# Patient Record
Sex: FEMALE | Race: WHITE | Employment: FULL TIME | URBAN - NONMETROPOLITAN AREA
[De-identification: names, ages, dates, MRNs, and addresses within clinical notes are randomized per-mention and may not be internally consistent; named-entity substitution may affect disease eponyms.]

---

## 2018-02-10 ENCOUNTER — HOSPITAL ENCOUNTER (EMERGENCY)
Age: 38
Discharge: HOME | End: 2018-02-11
Payer: MEDICARE

## 2018-02-10 DIAGNOSIS — R10.9 FLANK PAIN: ICD-10-CM

## 2018-02-10 DIAGNOSIS — F41.1 ANXIETY STATE: Primary | ICD-10-CM

## 2018-02-10 LAB
ANION GAP SERPL CALCULATED.3IONS-SCNC: 13 MEQ/L (ref 8–16)
BACTERIA: ABNORMAL /HPF
BASOPHILS # BLD: 0.2 %
BASOPHILS ABSOLUTE: 0 THOU/MM3 (ref 0–0.1)
BILIRUBIN URINE: NEGATIVE
BLOOD, URINE: ABNORMAL
BUN BLDV-MCNC: 6 MG/DL (ref 7–22)
CALCIUM SERPL-MCNC: 9.1 MG/DL (ref 8.5–10.5)
CASTS 2: ABNORMAL /LPF
CASTS UA: ABNORMAL /LPF
CHARACTER, URINE: CLEAR
CHLORIDE BLD-SCNC: 100 MEQ/L (ref 98–111)
CO2: 26 MEQ/L (ref 23–33)
COLOR: YELLOW
CREAT SERPL-MCNC: 0.6 MG/DL (ref 0.4–1.2)
CRYSTALS, UA: ABNORMAL
EOSINOPHIL # BLD: 0.2 %
EOSINOPHILS ABSOLUTE: 0 THOU/MM3 (ref 0–0.4)
EPITHELIAL CELLS, UA: ABNORMAL /HPF
FLU A ANTIGEN: NEGATIVE
FLU B ANTIGEN: NEGATIVE
GFR SERPL CREATININE-BSD FRML MDRD: > 90 ML/MIN/1.73M2
GLUCOSE BLD-MCNC: 101 MG/DL (ref 70–108)
GLUCOSE URINE: NEGATIVE MG/DL
HCT VFR BLD CALC: 47.3 % (ref 37–47)
HEMOGLOBIN: 15.9 GM/DL (ref 12–16)
KETONES, URINE: NEGATIVE
LEUKOCYTE ESTERASE, URINE: NEGATIVE
LYMPHOCYTES # BLD: 21.8 %
LYMPHOCYTES ABSOLUTE: 1.4 THOU/MM3 (ref 1–4.8)
MCH RBC QN AUTO: 32 PG (ref 27–31)
MCHC RBC AUTO-ENTMCNC: 33.6 GM/DL (ref 33–37)
MCV RBC AUTO: 95.5 FL (ref 81–99)
MISCELLANEOUS 2: ABNORMAL
MONOCYTES # BLD: 7.1 %
MONOCYTES ABSOLUTE: 0.5 THOU/MM3 (ref 0.4–1.3)
NITRITE, URINE: NEGATIVE
NUCLEATED RED BLOOD CELLS: 0 /100 WBC
OSMOLALITY CALCULATION: 275.3 MOSMOL/KG (ref 275–300)
PDW BLD-RTO: 13.9 % (ref 11.5–14.5)
PH UA: 7
PLATELET # BLD: 211 THOU/MM3 (ref 130–400)
PMV BLD AUTO: 7.9 FL (ref 7.4–10.4)
POTASSIUM SERPL-SCNC: 4 MEQ/L (ref 3.5–5.2)
PROTEIN UA: NEGATIVE
RBC # BLD: 4.95 MILL/MM3 (ref 4.2–5.4)
RBC URINE: ABNORMAL /HPF
RENAL EPITHELIAL, UA: ABNORMAL
SEG NEUTROPHILS: 70.7 %
SEGMENTED NEUTROPHILS ABSOLUTE COUNT: 4.6 THOU/MM3 (ref 1.8–7.7)
SODIUM BLD-SCNC: 139 MEQ/L (ref 135–145)
SPECIFIC GRAVITY, URINE: < 1.005 (ref 1–1.03)
UROBILINOGEN, URINE: 0.2 EU/DL
WBC # BLD: 6.5 THOU/MM3 (ref 4.8–10.8)
WBC UA: ABNORMAL /HPF
YEAST: ABNORMAL

## 2018-02-10 PROCEDURE — 80048 BASIC METABOLIC PNL TOTAL CA: CPT

## 2018-02-10 PROCEDURE — 36415 COLL VENOUS BLD VENIPUNCTURE: CPT

## 2018-02-10 PROCEDURE — 87804 INFLUENZA ASSAY W/OPTIC: CPT

## 2018-02-10 PROCEDURE — 81001 URINALYSIS AUTO W/SCOPE: CPT

## 2018-02-10 PROCEDURE — 85025 COMPLETE CBC W/AUTO DIFF WBC: CPT

## 2018-02-10 PROCEDURE — 99284 EMERGENCY DEPT VISIT MOD MDM: CPT

## 2018-02-10 PROCEDURE — 6370000000 HC RX 637 (ALT 250 FOR IP): Performed by: NURSE PRACTITIONER

## 2018-02-10 RX ORDER — ALPRAZOLAM 0.5 MG/1
0.5 TABLET ORAL ONCE
Status: COMPLETED | OUTPATIENT
Start: 2018-02-10 | End: 2018-02-10

## 2018-02-10 RX ADMIN — ALPRAZOLAM 0.5 MG: 0.5 TABLET ORAL at 21:53

## 2018-02-10 ASSESSMENT — ENCOUNTER SYMPTOMS
DIARRHEA: 0
NAUSEA: 0
BACK PAIN: 1
SINUS PAIN: 1
SHORTNESS OF BREATH: 0
VOMITING: 0
ABDOMINAL PAIN: 0
CHEST TIGHTNESS: 0
CONSTIPATION: 0
RHINORRHEA: 1
SINUS PRESSURE: 1

## 2018-02-10 ASSESSMENT — PAIN DESCRIPTION - ONSET: ONSET: ON-GOING

## 2018-02-10 ASSESSMENT — PAIN DESCRIPTION - FREQUENCY: FREQUENCY: INTERMITTENT

## 2018-02-10 ASSESSMENT — PAIN DESCRIPTION - LOCATION: LOCATION: BACK

## 2018-02-10 ASSESSMENT — PAIN DESCRIPTION - PAIN TYPE: TYPE: ACUTE PAIN

## 2018-02-10 ASSESSMENT — PAIN DESCRIPTION - PROGRESSION: CLINICAL_PROGRESSION: GRADUALLY WORSENING

## 2018-02-10 ASSESSMENT — PAIN DESCRIPTION - ORIENTATION: ORIENTATION: MID

## 2018-02-10 ASSESSMENT — PAIN SCALES - GENERAL: PAINLEVEL_OUTOF10: 6

## 2018-02-11 ENCOUNTER — APPOINTMENT (OUTPATIENT)
Dept: CT IMAGING | Age: 38
End: 2018-02-11
Payer: MEDICARE

## 2018-02-11 VITALS
HEART RATE: 88 BPM | DIASTOLIC BLOOD PRESSURE: 78 MMHG | BODY MASS INDEX: 44.22 KG/M2 | SYSTOLIC BLOOD PRESSURE: 116 MMHG | TEMPERATURE: 98.4 F | OXYGEN SATURATION: 98 % | WEIGHT: 259 LBS | RESPIRATION RATE: 18 BRPM | HEIGHT: 64 IN

## 2018-02-11 LAB — PREGNANCY, SERUM: NEGATIVE

## 2018-02-11 PROCEDURE — 84703 CHORIONIC GONADOTROPIN ASSAY: CPT

## 2018-02-11 PROCEDURE — 74176 CT ABD & PELVIS W/O CONTRAST: CPT

## 2018-02-11 PROCEDURE — 36415 COLL VENOUS BLD VENIPUNCTURE: CPT

## 2018-02-11 RX ORDER — VENLAFAXINE HYDROCHLORIDE 150 MG/1
150 CAPSULE, EXTENDED RELEASE ORAL DAILY
Qty: 6 CAPSULE | Refills: 0 | Status: SHIPPED | OUTPATIENT
Start: 2018-02-11 | End: 2019-04-29

## 2018-02-11 NOTE — ED NOTES
Patient resting on cot, respirations are easy and unlabored. Updated on POC. Patient denies any needs or concerns at this time. Patient's family is at bedside.       Barron Knott RN  02/10/18 7811

## 2018-02-11 NOTE — ED PROVIDER NOTES
[address]       Chief Complaint   Patient presents with    Other     out of medications, can't get control of emotions        Nurses Notes reviewed and I agree except as noted in the HPI. HISTORY OF PRESENT ILLNESS    Hussein Holcomb is a 40 y.o. female who presents For anxiety. Patient states that she has been out of her Effexor for 3 days and now feels like everything is hittting her at once. Patient states that she has been unable to get to the PCP to refill the prescription for Effexor. Today the patient is also complaining of left flank pain. He states that she has been having the left flank pain and painful urination since Friday. Denies any blood in the urine denies vaginal bleeding or vaginal discharge. REVIEW OF SYSTEMS     Review of Systems   Constitutional: Negative. HENT: Positive for congestion, rhinorrhea, sinus pain and sinus pressure. Respiratory: Negative for chest tightness and shortness of breath. Cardiovascular: Negative for chest pain and palpitations. Gastrointestinal: Negative for abdominal pain, constipation, diarrhea, nausea and vomiting. Genitourinary: Positive for difficulty urinating and flank pain. Negative for hematuria, vaginal bleeding, vaginal discharge and vaginal pain. Musculoskeletal: Positive for arthralgias and back pain. Skin: Negative. Neurological: Negative for dizziness, light-headedness and numbness. Psychiatric/Behavioral: The patient is nervous/anxious. PAST MEDICAL HISTORY    has a past medical history of Anxiety; Bipolar disorder ([address] and Depression. SURGICAL HISTORY      has no past surgical history on file. CURRENT MEDICATIONS       Previous Medications    No medications on file       ALLERGIES     has No Known Allergies. FAMILY HISTORY     has no family status information on file. family history is not on file.     SOCIAL HISTORY      reports that she has been smoking bowel obstruction. No urinary tract calculi. No hydroureteronephrosis. 5.5 x 5.8 x 7.4 cm right adnexal cystic lesion. Recommend pelvic ultrasound correlation. IUD in satisfactory position in the uterus. **This report has been created using voice recognition software. It may contain minor errors which are inherent in voice recognition technology. **      Final report electronically signed by Dr. Gilma Kemp on 2/11/2018 1:52 AM        .    Patient was informed of CT results. Patient declined an ultrasound which was offered to further evaluate the right adnexal cystic lesion as she is having no right sided abdominal pain. Laboratory work was reassuring. I observed the patient's condition to remain stable during the duration of the stay and I explained my proposed course of treatment to the patient, who was amenable to my decision. They were discharged home in stable condition with a prescription for 6 days worth of effexor after the patient presented the Rx bottle which was filled on 1-8-18 and 30 tablets were dispenced, and the patient will return to the ED if the symptoms become more severe in nature or otherwise change    I have given the patient strict written and verbal instructions about care at home, follow-up, and signs and symptoms of worsening of condition and they did verbalize understanding. Medications   ALPRAZolam Radonna Solo) tablet 0.5 mg (0.5 mg Oral Given 2/10/18 2943)         Patient was seen independently by myself. The Patient's final impression and disposition and plan was determined by myself. CRITICAL CARE:   None    CONSULTS:  None    PROCEDURES:  None    FINAL IMPRESSION      1. Anxiety state    2. Flank pain          DISPOSITION/PLAN   Patient was discharged in stable condition. Will return if symptoms change or worsen, or for any sign or symptom deemed emergent by the patient or family members.  Follow up as an outpatient, sooner if symptoms warrant.     PATIENT REFERRED TO:  DO Kel Nickerson  [address]    In 3 days      We [facility] Mat  As discussed with           DISCHARGE MEDICATIONS:  New Prescriptions    VENLAFAXINE (EFFEXOR XR) 150 MG EXTENDED RELEASE CAPSULE    Take 1 capsule by mouth daily       (Please note that portions of this note were completed with a voice recognition program.  Efforts were made to edit the dictations but occasionally words are mis-transcribed.)    Tisha De Anda, [address] [facility], [address] [facility]  02/11/18 8073

## 2018-03-18 ENCOUNTER — APPOINTMENT (OUTPATIENT)
Dept: ULTRASOUND IMAGING | Age: 38
End: 2018-03-18
Payer: MEDICARE

## 2018-03-18 ENCOUNTER — HOSPITAL ENCOUNTER (EMERGENCY)
Age: 38
Discharge: HOME | End: 2018-03-18
Payer: MEDICARE

## 2018-03-18 VITALS
SYSTOLIC BLOOD PRESSURE: 123 MMHG | OXYGEN SATURATION: 97 % | BODY MASS INDEX: 41.88 KG/M2 | WEIGHT: 245.3 LBS | RESPIRATION RATE: 18 BRPM | DIASTOLIC BLOOD PRESSURE: 56 MMHG | TEMPERATURE: 98.2 F | HEART RATE: 97 BPM | HEIGHT: 64 IN

## 2018-03-18 DIAGNOSIS — B37.89 CANDIDA RASH OF GROIN: ICD-10-CM

## 2018-03-18 DIAGNOSIS — N30.01 ACUTE CYSTITIS WITH HEMATURIA: Primary | ICD-10-CM

## 2018-03-18 LAB
BACTERIA: ABNORMAL /HPF
BILIRUBIN URINE: NEGATIVE
BLOOD, URINE: ABNORMAL
CASTS 2: ABNORMAL /LPF
CASTS UA: ABNORMAL /LPF
CHARACTER, URINE: ABNORMAL
CHLAMYDIA TRACHOMATIS BY RT-PCR: NOT DETECTED
COLOR: YELLOW
CRYSTALS, UA: ABNORMAL
CT/NG SOURCE: NORMAL
EPITHELIAL CELLS, UA: ABNORMAL /HPF
GLUCOSE URINE: NEGATIVE MG/DL
KETONES, URINE: NEGATIVE
KOH PREP: NORMAL
LEUKOCYTE ESTERASE, URINE: ABNORMAL
MISCELLANEOUS 2: ABNORMAL
NEISSERIA GONORRHOEAE BY RT-PCR: NOT DETECTED
NITRITE, URINE: NEGATIVE
PH UA: 6
PREGNANCY, URINE: NEGATIVE
PROTEIN UA: NEGATIVE
RBC URINE: ABNORMAL /HPF
RENAL EPITHELIAL, UA: ABNORMAL
SPECIFIC GRAVITY, URINE: < 1.005 (ref 1–1.03)
UROBILINOGEN, URINE: 1 EU/DL
WBC UA: ABNORMAL /HPF
YEAST: ABNORMAL

## 2018-03-18 PROCEDURE — 87220 TISSUE EXAM FOR FUNGI: CPT

## 2018-03-18 PROCEDURE — 81025 URINE PREGNANCY TEST: CPT

## 2018-03-18 PROCEDURE — 81001 URINALYSIS AUTO W/SCOPE: CPT

## 2018-03-18 PROCEDURE — 87205 SMEAR GRAM STAIN: CPT

## 2018-03-18 PROCEDURE — 87491 CHLMYD TRACH DNA AMP PROBE: CPT

## 2018-03-18 PROCEDURE — 99284 EMERGENCY DEPT VISIT MOD MDM: CPT

## 2018-03-18 PROCEDURE — 87591 N.GONORRHOEAE DNA AMP PROB: CPT

## 2018-03-18 PROCEDURE — 76830 TRANSVAGINAL US NON-OB: CPT

## 2018-03-18 PROCEDURE — 87086 URINE CULTURE/COLONY COUNT: CPT

## 2018-03-18 PROCEDURE — 87070 CULTURE OTHR SPECIMN AEROBIC: CPT

## 2018-03-18 RX ORDER — ONDANSETRON 4 MG/1
4 TABLET, FILM COATED ORAL EVERY 8 HOURS PRN
COMMUNITY
End: 2019-04-29

## 2018-03-18 RX ORDER — AMOXICILLIN AND CLAVULANATE POTASSIUM 250; 125 MG/1; MG/1
1 TABLET, FILM COATED ORAL 2 TIMES DAILY
COMMUNITY
Start: 2018-03-10 | End: 2019-04-29

## 2018-03-18 RX ORDER — NITROFURANTOIN 25; 75 MG/1; MG/1
100 CAPSULE ORAL 2 TIMES DAILY
Qty: 14 CAPSULE | Refills: 0 | Status: SHIPPED | OUTPATIENT
Start: 2018-03-18 | End: 2018-03-25

## 2018-03-18 RX ORDER — NYSTATIN 100000 [USP'U]/G
POWDER TOPICAL
Qty: 15 G | Refills: 0 | Status: SHIPPED | OUTPATIENT
Start: 2018-03-18 | End: 2019-04-29

## 2018-03-18 RX ORDER — OXCARBAZEPINE 300 MG/1
900 TABLET, FILM COATED ORAL DAILY
COMMUNITY
End: 2019-04-29

## 2018-03-18 ASSESSMENT — PAIN DESCRIPTION - PAIN TYPE: TYPE: ACUTE PAIN

## 2018-03-18 ASSESSMENT — PAIN DESCRIPTION - DESCRIPTORS: DESCRIPTORS: BURNING;CONSTANT

## 2018-03-18 ASSESSMENT — PAIN DESCRIPTION - FREQUENCY: FREQUENCY: CONTINUOUS

## 2018-03-18 ASSESSMENT — ENCOUNTER SYMPTOMS
CHEST TIGHTNESS: 0
SHORTNESS OF BREATH: 0
RHINORRHEA: 0
NAUSEA: 0
ABDOMINAL PAIN: 1
TROUBLE SWALLOWING: 0
VOMITING: 0
SORE THROAT: 0
WHEEZING: 0
COUGH: 0

## 2018-03-18 ASSESSMENT — PAIN DESCRIPTION - LOCATION: LOCATION: VAGINA

## 2018-03-18 ASSESSMENT — PAIN DESCRIPTION - ONSET: ONSET: SUDDEN

## 2018-03-18 ASSESSMENT — PAIN SCALES - GENERAL: PAINLEVEL_OUTOF10: 6

## 2018-03-18 ASSESSMENT — PAIN DESCRIPTION - PROGRESSION: CLINICAL_PROGRESSION: NOT CHANGED

## 2018-03-18 NOTE — ED NOTES
Patient lying on cart with eyes closed and resp easy and nonlabored. Daughter at bedside and states that \"mom is tired, had a long day'.       Manjinder Aguilar RN  03/18/18 7157

## 2018-03-18 NOTE — ED PROVIDER NOTES
[address]       Chief Complaint   Patient presents with    Dysuria    Vaginal Pain       Nurses Notes reviewed and I agree except as noted in the HPI. HISTORY OF PRESENT ILLNESS    Arjun Vang is a 40 y.o. female who presents For vaginal pain, itching, redness. Patient states this is been ongoing for several days. Patient states that she is taking antibiotics for an ear infection and believes that that is related to her symptoms. Patient is denying any new sexual partners however the patient told nursing staff that she currently has 2 boyfriend and she was treated for Trichomonas one month ago. REVIEW OF SYSTEMS     Review of Systems   Constitutional: Negative for fever. HENT: Negative for congestion, rhinorrhea, sore throat and trouble swallowing. Respiratory: Negative for cough, chest tightness, shortness of breath and wheezing. Cardiovascular: Negative for chest pain and palpitations. Gastrointestinal: Positive for abdominal pain. Negative for nausea and vomiting. Genitourinary: Positive for dysuria, vaginal discharge and vaginal pain. Negative for vaginal bleeding. Musculoskeletal: Negative for arthralgias and myalgias. Neurological: Negative for headaches. Hematological: Does not bruise/bleed easily. PAST MEDICAL HISTORY    has a past medical history of Anxiety; Bipolar disorder ([address] Depression; and Sexually transmitted infection. SURGICAL HISTORY      has no past surgical history on file.     CURRENT MEDICATIONS       Discharge Medication List as of 3/18/2018  5:02 AM      CONTINUE these medications which have NOT CHANGED    Details   OXcarbazepine (TRILEPTAL) 300 MG tablet Take 900 mg by mouth dailyHistorical Med      ondansetron (ZOFRAN) 4 MG tablet Take 4 mg by mouth every 8 hours as needed for Nausea or VomitingHistorical Med      amoxicillin-clavulanate (AUGMENTIN) 250-125 MG per tablet Take 1 tablet by mouth 2 Including but not limited to yeast infection, UTI, STD    DIAGNOSTIC RESULTS     EKG: All EKG's are interpreted by the Emergency Department Physician who either signs or Co-signs this chart in the absence of a cardiologist.  None    RADIOLOGY: non-plain film images(s) such as CT, Ultrasound and MRI are read by the radiologist.  Plain radiographic images are visualized and preliminarily interpreted by the emergency physician unless otherwise stated below. US NON OB TRANSVAGINAL   Final Result   1. Normal pelvic ultrasound. 2.  The IUD is present within the fundus of the uterus. **This report has been created using voice recognition software. It may contain minor errors which are inherent in voice recognition technology. **      Final report electronically signed by Dr. Lalit Brizuela on 3/18/2018 3:50 AM            LABS:   Labs Reviewed   URINE WITH REFLEXED MICRO - Abnormal; Notable for the following:        Result Value    Blood, Urine TRACE (*)     Leukocyte Esterase, Urine LARGE (*)     Character, Urine CLOUDY (*)     All other components within normal limits   KOH (SKIN,HAIR,NAILS)    Narrative:     Source: vaginal non-OB patient       Site:           Current Antibiotics: none   C. TRACHOMATIS / N. GONORRHOEAE, DNA   URINE CULTURE, REFLEXED   GENITAL CULTURE   PREGNANCY, URINE         EMERGENCY DEPARTMENT COURSE AND MEDICAL DECISION MAKING:   Vitals:    Vitals:    03/18/18 0153 03/18/18 0430   BP: 105/74 (!) 123/56   Pulse: 84 97   Resp: 18 18   Temp: 98.2 °F (36.8 °C)    TempSrc: Oral    SpO2: 98% 97%   Weight: 245 lb 4.8 oz (111.3 kg)    Height: 5' 4\" (1.626 m)          Pertinent Labs & Imaging studies reviewed. (See chart for details)    The patient was seen and evaluated within the ED today with Dysuria, vaginal pain, burning. Within the department, I observed the patient's vital signs to be within acceptable range.   On exam, I appreciated findings as documented in the physical exam.

## 2018-03-18 NOTE — ED NOTES
Patient presents to the ED with complaints of dysuria and vaginal pain. Patient states pain started on Thursday 03/08/2018 when she was diagnosed with a bladder infection. Patient states she is currently on day 7 of 10 of Augmentin. Patient states she has tried multiple OTC products to ease the pain and excoriation without relief. Patient states she currently has two sexual partners and was diagnosed with trichomoniasis approximately two months ago.       Jack Israel RN  03/18/18 4656

## 2018-03-19 LAB
ORGANISM: ABNORMAL
URINE CULTURE REFLEX: ABNORMAL

## 2018-03-21 LAB
GENITAL CULTURE, ROUTINE: ABNORMAL
GENITAL CULTURE, ROUTINE: ABNORMAL
GRAM STAIN RESULT: ABNORMAL
ORGANISM: ABNORMAL

## 2018-03-22 ENCOUNTER — TELEPHONE (OUTPATIENT)
Dept: PHARMACY | Age: 38
End: 2018-03-22

## 2018-03-23 NOTE — TELEPHONE ENCOUNTER
Call attempt #2, did not reach patient. Left message for patient to call back at [phone].     Armando Nassar, PharmD 3:14 PM 3/23/2018

## 2018-03-24 RX ORDER — FLUCONAZOLE 150 MG/1
150 TABLET ORAL ONCE
Qty: 1 TABLET | Refills: 0
Start: 2018-03-24 | End: 2018-03-24

## 2018-03-24 NOTE — TELEPHONE ENCOUNTER
Telephoned rx for Fluconazole 150 mg into [facility] in [place], [place] per patient request. Patient confirmed understanding to continue using nystatin powder as directed.  Grayson Hauser, PharmD 3/24/2018  3:14 PM

## 2019-04-29 ENCOUNTER — HOSPITAL ENCOUNTER (EMERGENCY)
Age: 39
Discharge: HOME | End: 2019-04-29
Attending: EMERGENCY MEDICINE
Payer: MEDICARE

## 2019-04-29 VITALS
HEART RATE: 78 BPM | SYSTOLIC BLOOD PRESSURE: 115 MMHG | DIASTOLIC BLOOD PRESSURE: 81 MMHG | RESPIRATION RATE: 17 BRPM | WEIGHT: 245 LBS | BODY MASS INDEX: 42.05 KG/M2 | TEMPERATURE: 98 F | OXYGEN SATURATION: 98 %

## 2019-04-29 DIAGNOSIS — F32.1 CURRENT MODERATE EPISODE OF MAJOR DEPRESSIVE DISORDER WITHOUT PRIOR EPISODE (HCC): Primary | ICD-10-CM

## 2019-04-29 LAB
ACETAMINOPHEN LEVEL: < 5 UG/ML (ref 0–20)
ALBUMIN SERPL-MCNC: 4.4 G/DL (ref 3.5–5.1)
ALP BLD-CCNC: 74 U/L (ref 38–126)
ALT SERPL-CCNC: 15 U/L (ref 11–66)
AMPHETAMINE+METHAMPHETAMINE URINE SCREEN: NEGATIVE
ANION GAP SERPL CALCULATED.3IONS-SCNC: 13 MEQ/L (ref 8–16)
AST SERPL-CCNC: 15 U/L (ref 5–40)
BACTERIA: NORMAL
BARBITURATE QUANTITATIVE URINE: NEGATIVE
BASOPHILS # BLD: 0.3 %
BASOPHILS ABSOLUTE: 0 THOU/MM3 (ref 0–0.1)
BENZODIAZEPINE QUANTITATIVE URINE: NEGATIVE
BILIRUB SERPL-MCNC: 0.2 MG/DL (ref 0.3–1.2)
BILIRUBIN DIRECT: < 0.2 MG/DL (ref 0–0.3)
BILIRUBIN URINE: NEGATIVE
BLOOD, URINE: NEGATIVE
BUN BLDV-MCNC: 6 MG/DL (ref 7–22)
CALCIUM SERPL-MCNC: 9.4 MG/DL (ref 8.5–10.5)
CANNABINOID QUANTITATIVE URINE: NEGATIVE
CASTS: NORMAL /LPF
CASTS: NORMAL /LPF
CHARACTER, URINE: CLEAR
CHLORIDE BLD-SCNC: 103 MEQ/L (ref 98–111)
CO2: 25 MEQ/L (ref 23–33)
COCAINE METABOLITE QUANTITATIVE URINE: NEGATIVE
COLOR: YELLOW
CREAT SERPL-MCNC: 0.7 MG/DL (ref 0.4–1.2)
CRYSTALS: NORMAL
EOSINOPHIL # BLD: 0.8 %
EOSINOPHILS ABSOLUTE: 0.1 THOU/MM3 (ref 0–0.4)
EPITHELIAL CELLS, UA: NORMAL /HPF
ERYTHROCYTE [DISTWIDTH] IN BLOOD BY AUTOMATED COUNT: 12.4 % (ref 11.5–14.5)
ERYTHROCYTE [DISTWIDTH] IN BLOOD BY AUTOMATED COUNT: 45.1 FL (ref 35–45)
ETHYL ALCOHOL, SERUM: < 0.01 %
GFR SERPL CREATININE-BSD FRML MDRD: > 90 ML/MIN/1.73M2
GLUCOSE BLD-MCNC: 94 MG/DL (ref 70–108)
GLUCOSE, URINE: NEGATIVE MG/DL
HCT VFR BLD CALC: 46.7 % (ref 37–47)
HEMOGLOBIN: 15.8 GM/DL (ref 12–16)
IMMATURE GRANS (ABS): 0.04 THOU/MM3 (ref 0–0.07)
IMMATURE GRANULOCYTES: 0.4 %
KETONES, URINE: NEGATIVE
LEUKOCYTE ESTERASE, URINE: NEGATIVE
LIPASE: 22.7 U/L (ref 5.6–51.3)
LYMPHOCYTES # BLD: 30.9 %
LYMPHOCYTES ABSOLUTE: 3.2 THOU/MM3 (ref 1–4.8)
MAGNESIUM: 2.2 MG/DL (ref 1.6–2.4)
MCH RBC QN AUTO: 33.3 PG (ref 26–33)
MCHC RBC AUTO-ENTMCNC: 33.8 GM/DL (ref 32.2–35.5)
MCV RBC AUTO: 98.3 FL (ref 81–99)
MISCELLANEOUS LAB TEST RESULT: NORMAL
MONOCYTES # BLD: 4.9 %
MONOCYTES ABSOLUTE: 0.5 THOU/MM3 (ref 0.4–1.3)
NITRITE, URINE: NEGATIVE
NUCLEATED RED BLOOD CELLS: 0 /100 WBC
OPIATES, URINE: NEGATIVE
OSMOLALITY CALCULATION: 278.6 MOSMOL/KG (ref 275–300)
OXYCODONE: NEGATIVE
PH UA: 7.5 (ref 5–9)
PHENCYCLIDINE QUANTITATIVE URINE: NEGATIVE
PLATELET # BLD: 351 THOU/MM3 (ref 130–400)
PMV BLD AUTO: 9 FL (ref 9.4–12.4)
POTASSIUM SERPL-SCNC: 4.5 MEQ/L (ref 3.5–5.2)
PREGNANCY, SERUM: NEGATIVE
PROTEIN UA: NEGATIVE MG/DL
RBC # BLD: 4.75 MILL/MM3 (ref 4.2–5.4)
RBC URINE: NORMAL /HPF
RENAL EPITHELIAL, UA: NORMAL
SALICYLATE, SERUM: < 0.3 MG/DL (ref 2–10)
SEG NEUTROPHILS: 62.7 %
SEGMENTED NEUTROPHILS ABSOLUTE COUNT: 6.6 THOU/MM3 (ref 1.8–7.7)
SODIUM BLD-SCNC: 141 MEQ/L (ref 135–145)
SPECIFIC GRAVITY UA: < 1.005 (ref 1–1.03)
TOTAL PROTEIN: 7.4 G/DL (ref 6.1–8)
TSH SERPL DL<=0.05 MIU/L-ACNC: 1.29 UIU/ML (ref 0.4–4.2)
UROBILINOGEN, URINE: 0.2 EU/DL (ref 0–1)
WBC # BLD: 10.5 THOU/MM3 (ref 4.8–10.8)
WBC UA: NORMAL /HPF
YEAST: NORMAL

## 2019-04-29 PROCEDURE — 80053 COMPREHEN METABOLIC PANEL: CPT

## 2019-04-29 PROCEDURE — 81001 URINALYSIS AUTO W/SCOPE: CPT

## 2019-04-29 PROCEDURE — 99285 EMERGENCY DEPT VISIT HI MDM: CPT

## 2019-04-29 PROCEDURE — 85025 COMPLETE CBC W/AUTO DIFF WBC: CPT

## 2019-04-29 PROCEDURE — 84443 ASSAY THYROID STIM HORMONE: CPT

## 2019-04-29 PROCEDURE — 36415 COLL VENOUS BLD VENIPUNCTURE: CPT

## 2019-04-29 PROCEDURE — 80307 DRUG TEST PRSMV CHEM ANLYZR: CPT

## 2019-04-29 PROCEDURE — 84703 CHORIONIC GONADOTROPIN ASSAY: CPT

## 2019-04-29 PROCEDURE — G0480 DRUG TEST DEF 1-7 CLASSES: HCPCS

## 2019-04-29 PROCEDURE — 82248 BILIRUBIN DIRECT: CPT

## 2019-04-29 PROCEDURE — 83690 ASSAY OF LIPASE: CPT

## 2019-04-29 PROCEDURE — 83735 ASSAY OF MAGNESIUM: CPT

## 2019-04-29 ASSESSMENT — ENCOUNTER SYMPTOMS
EYE ITCHING: 0
VOICE CHANGE: 0
RHINORRHEA: 0
BLOOD IN STOOL: 0
DIARRHEA: 0
PHOTOPHOBIA: 0
ABDOMINAL PAIN: 0
SORE THROAT: 0
CHOKING: 0
WHEEZING: 0
EYE REDNESS: 0
CONSTIPATION: 0
NAUSEA: 0
BACK PAIN: 0
EYE DISCHARGE: 0
VOMITING: 0
ABDOMINAL DISTENTION: 0
TROUBLE SWALLOWING: 0
COUGH: 0
CHEST TIGHTNESS: 0
SHORTNESS OF BREATH: 0
EYE PAIN: 0
SINUS PRESSURE: 0

## 2019-04-29 ASSESSMENT — SLEEP AND FATIGUE QUESTIONNAIRES
DIFFICULTY FALLING ASLEEP: YES
DIFFICULTY STAYING ASLEEP: YES
RESTFUL SLEEP: NO
DO YOU HAVE DIFFICULTY SLEEPING: YES
AVERAGE NUMBER OF SLEEP HOURS: 3
DO YOU USE A SLEEP AID: NO
DIFFICULTY ARISING: NO
SLEEP PATTERN: DIFFICULTY FALLING ASLEEP;EARLY AWAKENING;RESTLESSNESS

## 2019-04-29 ASSESSMENT — PATIENT HEALTH QUESTIONNAIRE - PHQ9: SUM OF ALL RESPONSES TO PHQ QUESTIONS 1-9: 18

## 2019-04-29 ASSESSMENT — LIFESTYLE VARIABLES: HISTORY_ALCOHOL_USE: NO

## 2019-04-30 NOTE — ED NOTES
Patient placed in safe room that is ligature resistant with continuous monitoring in place. Provider notified, requested an assessment by behavioral health . Patient belongings secured in a locked lockers outside of the room. Explained suicide prevention precautions to the patient including constant observer.       Sol Guardado RN  04/29/19 5791

## 2019-04-30 NOTE — ED TRIAGE NOTES
Pt arrives to the ED with a chief complaint of being suicidal and anxiety. Pt reports she has history of bi-polar disease and was at her family doctor today and was having thoughts of not wanting live anymore. Pt denies any plan on killing herself, states she just doesn't want to live anymore. Pt is moved to 25, pt is changed into scrubs. Provider to see patient.

## 2019-04-30 NOTE — ED PROVIDER NOTES
[facility]  eMERGENCY dEPARTMENT eNCOUnter          CHIEF COMPLAINT       Chief Complaint   Patient presents with    Suicidal    Anxiety       Nurses Notes reviewed and I agreeexcept as noted in the HPI. HISTORY OF PRESENT ILLNESS    Rubio Finney is a 41 y.o. female who presents to the Emergency Department for the evaluation of suicidal ideation and anxiety. The patient has a history of bipolar disorder. The patient reports that she has been off her medications for the past year. Over the course of the past 4-5 months her symptoms have been worsening. Earlier today the patient states she had a \"breakdown\" in the parking lot of her work. The patient states that she attempted to get in with her PCP to be placed back on her medications. She was evaluated by her PCP and mentioned that she was experiencing suicidal ideation and was instructed to come to the ED for emergent intervention. The patient states that she has no plan or intent to commit suicide. The patient states that she would not commit suicide because she has several children who need her. The patient reports having several unexplained emotional moments without known exacerbating stressors, since she was 16years old. The patient denies any physical complaints, or any other signs or symptoms at this time. The patient denies any alcohol consumption, or illicit drug use. The HPI was provided by the patient. REVIEW OF SYSTEMS      Review of Systems   Constitutional: Negative for activity change, appetite change, diaphoresis, fatigue and unexpected weight change. HENT: Negative for congestion, ear discharge, ear pain, hearing loss, rhinorrhea, sinus pressure, sore throat, trouble swallowing and voice change. Eyes: Negative for photophobia, pain, discharge, redness and itching. Respiratory: Negative for cough, choking, chest tightness, shortness of breath and wheezing.     Cardiovascular: Negative for chest pain, palpitations and leg swelling. Gastrointestinal: Negative for abdominal distention, abdominal pain, blood in stool, constipation, diarrhea, nausea and vomiting. Endocrine: Negative for polydipsia, polyphagia and polyuria. Genitourinary: Negative for decreased urine volume, difficulty urinating, dysuria, enuresis, frequency, hematuria and urgency. Musculoskeletal: Negative for arthralgias, back pain, gait problem, myalgias, neck pain and neck stiffness. Skin: Negative for pallor and rash. Allergic/Immunologic: Negative for immunocompromised state. Neurological: Negative for dizziness, tremors, seizures, syncope, facial asymmetry, weakness, light-headedness, numbness and headaches. Hematological: Negative for adenopathy. Does not bruise/bleed easily. Psychiatric/Behavioral: Positive for suicidal ideas. Negative for agitation and hallucinations. The patient is nervous/anxious. PAST MEDICAL HISTORY    has a past medical history of Anxiety, Bipolar disorder ([facility] [address] Depression, and Sexually transmitted infection. SURGICAL HISTORY      has no past surgical history on file. CURRENT MEDICATIONS       Discharge Medication List as of 4/29/2019 10:55 PM          ALLERGIES     has No Known Allergies. FAMILY HISTORY     has no family status information on file. family history is not on file. SOCIAL HISTORY    reports that she has been smoking cigarettes. She has been smoking about 2.00 packs per day. She has never used smokeless tobacco. She reports that she does not drink alcohol or use drugs. PHYSICAL EXAM     INITIAL VITALS:  weight is 245 lb (111.1 kg). Her oral temperature is 98 °F (36.7 °C). Her blood pressure is 115/81 and her pulse is 78. Her respiration is 17 and oxygen saturation is 98%. Physical Exam   Constitutional: She is oriented to person, place, and time. She appears well-developed and well-nourished. No distress. HENT:   Head: Normocephalic and atraumatic. Right Ear: External ear normal.   Left Ear: External ear normal.   Nose: Nose normal.   Mouth/Throat: Oropharynx is clear and moist. No oropharyngeal exudate. Eyes: Pupils are equal, round, and reactive to light. Conjunctivae and EOM are normal. Right eye exhibits no discharge. Left eye exhibits no discharge. No scleral icterus. Neck: Normal range of motion. Neck supple. No JVD present. No tracheal deviation present. Cardiovascular: Normal rate, regular rhythm, normal heart sounds and intact distal pulses. Exam reveals no gallop and no friction rub. No murmur heard. Pulmonary/Chest: Effort normal and breath sounds normal. She has no wheezes. She has no rales. Abdominal: Soft. Bowel sounds are normal. She exhibits no mass. There is no tenderness. There is no rebound and no guarding. Musculoskeletal: Normal range of motion. She exhibits no edema or tenderness. Lymphadenopathy:     She has no cervical adenopathy. Neurological: She is alert and oriented to person, place, and time. She has normal reflexes. She displays normal reflexes. No cranial nerve deficit. She exhibits normal muscle tone. Coordination normal.   Skin: Skin is warm and dry. No rash noted. She is not diaphoretic. Psychiatric: She has a normal mood and affect. Her behavior is normal. Judgment normal. She expresses no suicidal ideation. She expresses no suicidal plans. Nursing note and vitals reviewed.         DIFFERENTIAL DIAGNOSIS:   Differential diagnoses were discussed extensively with the patient and family including but no limited to suicidal ideation, substance induced mood disorder, major depressive disorder, bipolar disorder      DIAGNOSTIC RESULTS     EKG: All EKG's are interpreted by the Emergency Department Physician who either signs or Co-signs this chart in the absence of a cardiologist.  EKG interpreted by Devonte Campos DO:    None    RADIOLOGY: non-plain film images(s) such as CT, Ultrasound and MRI are read by the radiologist.    No orders to display       LABS:   Labs Reviewed   CBC WITH AUTO DIFFERENTIAL - Abnormal; Notable for the following components:       Result Value    MCH 33.3 (*)     RDW-SD 45.1 (*)     MPV 9.0 (*)     All other components within normal limits   BASIC METABOLIC PANEL - Abnormal; Notable for the following components:    BUN 6 (*)     All other components within normal limits   HEPATIC FUNCTION PANEL - Abnormal; Notable for the following components: Total Bilirubin 0.2 (*)     All other components within normal limits   SALICYLATE LEVEL - Abnormal; Notable for the following components:    Salicylate, Serum < 0.3 (*)     All other components within normal limits   LIPASE   MAGNESIUM   HCG, SERUM, QUALITATIVE   TSH WITHOUT REFLEX   ACETAMINOPHEN LEVEL   ETHANOL   URINE DRUG SCREEN   URINALYSIS WITH MICROSCOPIC   ANION GAP   GLOMERULAR FILTRATION RATE, ESTIMATED   OSMOLALITY       EMERGENCY DEPARTMENT COURSE:   Vitals:    Vitals:    04/29/19 2102   BP: 115/81   Pulse: 78   Resp: 17   Temp: 98 °F (36.7 °C)   TempSrc: Oral   SpO2: 98%   Weight: 245 lb (111.1 kg)     9:33 PM: The patient was seen andevaluated. Appropriate labs were ordered and reviewed. The patient was seen and evaluated in a timely manner for suicidal ideation, and an unexplained emotional event. Her vital signs were stable. During the physical exam I noted a benign exam. I ordered appropriate labs. Imaging results were reviewed and discussed with the patient. I discussed the case with Pallavi Alexander of the [facility] team who agreed to evaluate the patient in the ED. She evaluated the patient in the ED, after the patient was medically cleared, and discussed the case with Dr. Jessica Mccord, the on call psychiatrist, who recommended outpatient follow up with [facility]. The patient responded well to treatment, and I noted her condition to remain. I agreed that the patient could be discharged home with instructions.  I explained my proposed course of discharge to the patient, and she verbalized understanding and agreement with my proposed plan. All her questions were addressed at bedside. The patient will return to the emergency department for any new or worsening symptoms. Patient has what appears to be mild depressive disorder. Patient is instructed to follow-up with [facility] either here or in [place] to establish with her psychiatrist and restart her medications as previously prescribed. Patient is instructed to take all and any other medications as currently prescribed. Patient is instructed to follow-up with her primary care physician and to do so within the next 1-2 days. Patient is instructed return to the emergency room immediately for any new or worsening complaints. CRITICALCARE:   None     CONSULTS:  I discussed the case with Nabil Nguyen of the [facility] team who agreed to evaluate the patient in the ED. She evaluated the patient in the ED, after the patient was medically cleared, and discussed the case with Dr. Gerard Bejarano, the on call psychiatrist, who recommended outpatient follow up with [facility]. PROCEDURES:  None    FINAL IMPRESSION      1. Current moderate episode of major depressive disorder without prior episode [facility])          DISPOSITION/PLAN   Discharge home in stable condition. PATIENT REFERRED TO:  Lex Day  [address] [facility] [address]  [phone]    Call in 1 day      [phone]    Call in 2 days        DISCHARGE MEDICATIONS:  Discharge Medication List as of 4/29/2019 10:55 PM          (Please note that portions of this note were completed with a voice recognition program.  Efforts weremade to edit the dictations but occasionally words are mis-transcribed.)    Scribe:  Heather Mullins 4/29/19 9:33 PM Scribing for and in the presence ofSamuel Gutierrez DO.     Scribe: Heather Mullins 4/29/19 9:33

## 2019-04-30 NOTE — PROGRESS NOTES
cooperative, tearful at times. Pt endorse feelings of depression, little interest/pleasure in doing things, trouble concentrating. Pt is not interested in inpatient psychiatric treatment. Level of Care Disposition:      Consult with the attending ED Provider, Dr. Alexander Mckinley. 2233  Consult with Dr. Teresa Rodriguez who recommend pt follow up with outpatient mental health services or the IOP Program.      2235  Pt informed and is not interested in the IOP Program and reportedly is aware of her community resources, pt lives in [place]. (Pt given information on [facility])    Dr. Alexander Mckinley updated, pt to be discharged.

## 2021-05-19 ENCOUNTER — HOSPITAL ENCOUNTER (OUTPATIENT)
Age: 41
Setting detail: SPECIMEN
Discharge: HOME | End: 2021-05-19
Payer: MEDICARE

## 2021-05-19 LAB
DIRECT EXAM: ABNORMAL
Lab: ABNORMAL
SPECIMEN DESCRIPTION: ABNORMAL

## 2021-05-20 LAB
CHLAMYDIA BY THIN PREP: NEGATIVE
N. GONORRHOEAE DNA, THIN PREP: NEGATIVE
SPECIMEN DESCRIPTION: NORMAL

## 2021-05-21 LAB
HPV SAMPLE: ABNORMAL
HPV, GENOTYPE 16: NOT DETECTED
HPV, GENOTYPE 18: NOT DETECTED
HPV, HIGH RISK OTHER: DETECTED
HPV, INTERPRETATION: ABNORMAL
SPECIMEN DESCRIPTION: ABNORMAL

## 2021-05-24 LAB — CYTOLOGY REPORT: NORMAL
